# Patient Record
Sex: MALE | Race: WHITE | Employment: UNEMPLOYED | URBAN - METROPOLITAN AREA
[De-identification: names, ages, dates, MRNs, and addresses within clinical notes are randomized per-mention and may not be internally consistent; named-entity substitution may affect disease eponyms.]

---

## 2019-03-13 ENCOUNTER — OFFICE VISIT (OUTPATIENT)
Dept: ORTHOPEDIC SURGERY | Age: 74
End: 2019-03-13
Payer: COMMERCIAL

## 2019-03-13 VITALS
WEIGHT: 188 LBS | HEIGHT: 68 IN | DIASTOLIC BLOOD PRESSURE: 61 MMHG | SYSTOLIC BLOOD PRESSURE: 132 MMHG | HEART RATE: 66 BPM | RESPIRATION RATE: 16 BRPM | BODY MASS INDEX: 28.49 KG/M2

## 2019-03-13 DIAGNOSIS — M79.672 FOOT PAIN, LEFT: ICD-10-CM

## 2019-03-13 DIAGNOSIS — M20.22 HALLUX RIGIDUS OF LEFT FOOT: Primary | ICD-10-CM

## 2019-03-13 PROCEDURE — 99214 OFFICE O/P EST MOD 30 MIN: CPT | Performed by: ORTHOPAEDIC SURGERY

## 2019-05-06 ENCOUNTER — TELEPHONE (OUTPATIENT)
Dept: ORTHOPEDIC SURGERY | Age: 74
End: 2019-05-06

## 2019-05-10 ENCOUNTER — OFFICE VISIT (OUTPATIENT)
Dept: ORTHOPEDIC SURGERY | Age: 74
End: 2019-05-10
Payer: COMMERCIAL

## 2019-05-10 VITALS
DIASTOLIC BLOOD PRESSURE: 79 MMHG | SYSTOLIC BLOOD PRESSURE: 159 MMHG | WEIGHT: 194 LBS | HEART RATE: 89 BPM | RESPIRATION RATE: 16 BRPM | HEIGHT: 68 IN | BODY MASS INDEX: 29.4 KG/M2

## 2019-05-10 DIAGNOSIS — M48.062 SPINAL STENOSIS OF LUMBAR REGION WITH NEUROGENIC CLAUDICATION: Primary | ICD-10-CM

## 2019-05-10 PROCEDURE — 99213 OFFICE O/P EST LOW 20 MIN: CPT | Performed by: ORTHOPAEDIC SURGERY

## 2019-05-10 NOTE — PROGRESS NOTES
History of present illness:   Mr. Louie Jacob  is a pleasant 68 y.o. male is status post at least 6 decompressions and fusions from his cervical to lumbar spines and with a PMH of borderline diabetes, ulcerative colitis, arthritis of the spine here for consultation regarding increased low back pain after fall 6 days ago. His pain has improved substantially over the last 2 days. He reports generalized weakness of his bilateral leg and denies bowel or bladder dysfunction. He states he can sit for as long as he likes and stand for only a few minutes given his unsteadiness. He does report very frequent falls and uses a fracture to help ambulate at home. The pain does not disrupts his sleep. He takes methadone and Norco.  He has evaluated by neurology in the past 3 years. He is well-developed and well-nourished, is in no obvious pain and alert and oriented to person, place, and time. He demonstrates appropriate mood and affect. His skin is warm and dry. His gait is nonpainful, but he walks with quite a bit of instability. He stands with slight lumbar flexion. His lumbar flexion, extension and lateral bending are moderately reduced with pain. He has minimal tenderness over his lumbar spine and associated hardware without obvious muscle spasm. The skin over his lumbar spine shows a surgical scar. He has 4/5 motor strength of bilateral lower extremities. He has a negative straight leg raise, bilaterally. Normal deep tendon reflexes at knees. Sensation is intact to light touch L3 to S1 bilaterally. He has no clonus. Hip range of motion painless. I reviewed AP and lateral x-rays of his lumbar spine or obtain the office today. They show. They show severe multilevel degenerative disc disease, lumbar kyphosis and post-fusion changes. I do not see an obvious fracture.     MRIs of his cervical and thoracic spines from 3 years ago show degenerative changes, foraminal stenosis, postsurgical changes but no cord compression      Assessment:  Cervical stenosis  Thoracic stenosis  Lumbar stenosis    Plan:  I recommended he slowly increase his activities and return on an as-needed basis. I do not believe he is a candidate for spinal surgery at this time.

## 2020-09-01 ENCOUNTER — OFFICE VISIT (OUTPATIENT)
Dept: ORTHOPEDIC SURGERY | Age: 75
End: 2020-09-01
Payer: COMMERCIAL

## 2020-09-01 VITALS — BODY MASS INDEX: 29.4 KG/M2 | HEIGHT: 68 IN | WEIGHT: 194 LBS

## 2020-09-01 PROCEDURE — 99213 OFFICE O/P EST LOW 20 MIN: CPT | Performed by: PHYSICIAN ASSISTANT

## 2020-09-01 NOTE — PROGRESS NOTES
History of present illness:   Mr. Yaima Andres  is a pleasant 69 y.o. male is status post at least 6 decompressions and fusions from his cervical to lumbar spines and with a PMH of borderline diabetes, ulcerative colitis, arthritis of the spine here regarding his new low back pain after picking up a 30lb bag of dog food on 8/7/20. States his pain is in his left buttock to his medial knee. States his knee pain is greater than his back pain. States he was evaluated for his knee at [facility] and found no pathology. Pain is worse with standing and walking. Denies progressive weakness of his legs. Denies numbness and tingling. Ambulates with rolling walker. Denies bowel/bladder dysfunction. Has tried physical therapy and epidural injections in the past. He takes methadone and Norco.    He is well-developed and well-nourished, is in no obvious pain and alert and oriented to person, place, and time. He demonstrates appropriate mood and affect. His skin is warm and dry. His gait is nonpainful, but he walks with quite a bit of instability. He stands with slight lumbar flexion. His lumbar flexion, extension and lateral bending are moderately reduced with pain. He has minimal tenderness over his lumbar spine and associated hardware without obvious muscle spasm. The skin over his lumbar spine shows a well healed surgical scar. He has 4/5 motor strength of bilateral lower extremities. He has a negative straight leg raise, bilaterally. Normal deep tendon reflexes at knees. Sensation is intact to light touch L3 to S1 bilaterally. He has no clonus. Hip range of motion painless. I reviewed AP and lateral x-rays of his lumbar spine or obtain the office today. They show severe multilevel degenerative disc disease, lumbar kyphosis and post-fusion changes. I do not see an obvious fracture.     MRIs of his cervical and thoracic spines from 4 years ago show degenerative changes, foraminal stenosis, postsurgical changes but no cord compression    Assessment:  Lumbar stenosis  Lumbar radiculopathy    Plan:  Discussed treatment options including oral steroids, physical therapy, additional imaging, and epidural injections. Patient would like to proceed with lumbar MRI with and without contrast. States he will need conscious sedation to be completed. We will call him with results.      Loralyn Lennox PA-C

## 2020-09-03 ENCOUNTER — TELEPHONE (OUTPATIENT)
Dept: ORTHOPEDIC SURGERY | Age: 75
End: 2020-09-03

## 2020-09-03 NOTE — TELEPHONE ENCOUNTER
SCARLET for patient that MRI is approved. He will call [facility] to schedule.

## 2020-09-04 ENCOUNTER — TELEPHONE (OUTPATIENT)
Dept: ORTHOPEDIC SURGERY | Age: 75
End: 2020-09-04

## 2020-09-04 DIAGNOSIS — M48.062 SPINAL STENOSIS OF LUMBAR REGION WITH NEUROGENIC CLAUDICATION: Primary | ICD-10-CM

## 2020-09-14 ENCOUNTER — OFFICE VISIT (OUTPATIENT)
Dept: PRIMARY CARE CLINIC | Age: 75
End: 2020-09-14
Payer: COMMERCIAL

## 2020-09-14 PROCEDURE — 99211 OFF/OP EST MAY X REQ PHY/QHP: CPT | Performed by: NURSE PRACTITIONER

## 2020-09-14 NOTE — PATIENT INSTRUCTIONS

## 2020-09-14 NOTE — PROGRESS NOTES
Lisseth Naik received a viral test for COVID-19. They were educated on isolation and quarantine as appropriate. For any symptoms, they were directed to seek care from their PCP, given contact information to establish with a doctor, directed to an urgent care or the emergency room.

## 2020-09-16 LAB — SARS-COV-2, NAA: NOT DETECTED

## 2020-09-18 ENCOUNTER — ANESTHESIA (OUTPATIENT)
Dept: MRI IMAGING | Age: 75
End: 2020-09-18

## 2020-09-18 ENCOUNTER — ANESTHESIA EVENT (OUTPATIENT)
Dept: MRI IMAGING | Age: 75
End: 2020-09-18

## 2020-09-18 ENCOUNTER — HOSPITAL ENCOUNTER (OUTPATIENT)
Dept: MRI IMAGING | Age: 75
Discharge: HOME | End: 2020-09-18
Payer: COMMERCIAL

## 2020-09-18 VITALS
SYSTOLIC BLOOD PRESSURE: 111 MMHG | OXYGEN SATURATION: 97 % | DIASTOLIC BLOOD PRESSURE: 49 MMHG | RESPIRATION RATE: 14 BRPM

## 2020-09-18 VITALS
RESPIRATION RATE: 16 BRPM | WEIGHT: 207.78 LBS | DIASTOLIC BLOOD PRESSURE: 84 MMHG | SYSTOLIC BLOOD PRESSURE: 124 MMHG | BODY MASS INDEX: 31.59 KG/M2 | TEMPERATURE: 97 F | HEART RATE: 61 BPM | OXYGEN SATURATION: 96 %

## 2020-09-18 LAB
GLUCOSE BLD-MCNC: 113 MG/DL (ref 70–99)
PERFORMED ON: ABNORMAL

## 2020-09-18 PROCEDURE — 7100000000 HC PACU RECOVERY - FIRST 15 MIN

## 2020-09-18 PROCEDURE — 6360000002 HC RX W HCPCS: Performed by: NURSE ANESTHETIST, CERTIFIED REGISTERED

## 2020-09-18 PROCEDURE — A9577 INJ MULTIHANCE: HCPCS | Performed by: PHYSICIAN ASSISTANT

## 2020-09-18 PROCEDURE — 3700000001 HC ADD 15 MINUTES (ANESTHESIA)

## 2020-09-18 PROCEDURE — 2500000003 HC RX 250 WO HCPCS

## 2020-09-18 PROCEDURE — 3700000000 HC ANESTHESIA ATTENDED CARE

## 2020-09-18 PROCEDURE — 6360000002 HC RX W HCPCS

## 2020-09-18 PROCEDURE — 6360000004 HC RX CONTRAST MEDICATION: Performed by: PHYSICIAN ASSISTANT

## 2020-09-18 PROCEDURE — 7100000001 HC PACU RECOVERY - ADDTL 15 MIN

## 2020-09-18 PROCEDURE — 7100000011 HC PHASE II RECOVERY - ADDTL 15 MIN

## 2020-09-18 PROCEDURE — 72158 MRI LUMBAR SPINE W/O & W/DYE: CPT

## 2020-09-18 PROCEDURE — 2500000003 HC RX 250 WO HCPCS: Performed by: NURSE ANESTHETIST, CERTIFIED REGISTERED

## 2020-09-18 PROCEDURE — 2580000003 HC RX 258: Performed by: NURSE ANESTHETIST, CERTIFIED REGISTERED

## 2020-09-18 PROCEDURE — 7100000010 HC PHASE II RECOVERY - FIRST 15 MIN

## 2020-09-18 RX ORDER — GLYCOPYRROLATE 0.2 MG/ML
INJECTION INTRAMUSCULAR; INTRAVENOUS PRN
Status: DISCONTINUED | OUTPATIENT
Start: 2020-09-18 | End: 2020-09-18 | Stop reason: SDUPTHER

## 2020-09-18 RX ORDER — KETAMINE HCL IN NACL, ISO-OSM 100MG/10ML
SYRINGE (ML) INJECTION PRN
Status: DISCONTINUED | OUTPATIENT
Start: 2020-09-18 | End: 2020-09-18 | Stop reason: SDUPTHER

## 2020-09-18 RX ORDER — DEXAMETHASONE SODIUM PHOSPHATE 4 MG/ML
INJECTION, SOLUTION INTRA-ARTICULAR; INTRALESIONAL; INTRAMUSCULAR; INTRAVENOUS; SOFT TISSUE PRN
Status: DISCONTINUED | OUTPATIENT
Start: 2020-09-18 | End: 2020-09-18 | Stop reason: SDUPTHER

## 2020-09-18 RX ORDER — PROPOFOL 10 MG/ML
INJECTION, EMULSION INTRAVENOUS PRN
Status: DISCONTINUED | OUTPATIENT
Start: 2020-09-18 | End: 2020-09-18 | Stop reason: SDUPTHER

## 2020-09-18 RX ORDER — SODIUM CHLORIDE 9 MG/ML
INJECTION, SOLUTION INTRAVENOUS CONTINUOUS PRN
Status: DISCONTINUED | OUTPATIENT
Start: 2020-09-18 | End: 2020-09-18 | Stop reason: SDUPTHER

## 2020-09-18 RX ORDER — FENTANYL CITRATE 50 UG/ML
25 INJECTION, SOLUTION INTRAMUSCULAR; INTRAVENOUS EVERY 5 MIN PRN
Status: DISCONTINUED | OUTPATIENT
Start: 2020-09-18 | End: 2020-09-19 | Stop reason: HOSPADM

## 2020-09-18 RX ORDER — ONDANSETRON 2 MG/ML
INJECTION INTRAMUSCULAR; INTRAVENOUS PRN
Status: DISCONTINUED | OUTPATIENT
Start: 2020-09-18 | End: 2020-09-18 | Stop reason: SDUPTHER

## 2020-09-18 RX ORDER — MIDAZOLAM HYDROCHLORIDE 1 MG/ML
INJECTION INTRAMUSCULAR; INTRAVENOUS PRN
Status: DISCONTINUED | OUTPATIENT
Start: 2020-09-18 | End: 2020-09-18 | Stop reason: SDUPTHER

## 2020-09-18 RX ORDER — ONDANSETRON 2 MG/ML
4 INJECTION INTRAMUSCULAR; INTRAVENOUS
Status: ACTIVE | OUTPATIENT
Start: 2020-09-18 | End: 2020-09-18

## 2020-09-18 RX ORDER — OXYCODONE HYDROCHLORIDE AND ACETAMINOPHEN 5; 325 MG/1; MG/1
1 TABLET ORAL PRN
Status: ACTIVE | OUTPATIENT
Start: 2020-09-18 | End: 2020-09-18

## 2020-09-18 RX ORDER — FENTANYL CITRATE 50 UG/ML
INJECTION, SOLUTION INTRAMUSCULAR; INTRAVENOUS PRN
Status: DISCONTINUED | OUTPATIENT
Start: 2020-09-18 | End: 2020-09-18 | Stop reason: SDUPTHER

## 2020-09-18 RX ORDER — OXYCODONE HYDROCHLORIDE AND ACETAMINOPHEN 5; 325 MG/1; MG/1
2 TABLET ORAL PRN
Status: ACTIVE | OUTPATIENT
Start: 2020-09-18 | End: 2020-09-18

## 2020-09-18 RX ORDER — LIDOCAINE HYDROCHLORIDE 10 MG/ML
INJECTION, SOLUTION EPIDURAL; INFILTRATION; INTRACAUDAL; PERINEURAL PRN
Status: DISCONTINUED | OUTPATIENT
Start: 2020-09-18 | End: 2020-09-18 | Stop reason: SDUPTHER

## 2020-09-18 RX ADMIN — MIDAZOLAM 2 MG: 1 INJECTION INTRAMUSCULAR; INTRAVENOUS at 09:53

## 2020-09-18 RX ADMIN — DEXAMETHASONE SODIUM PHOSPHATE 8 MG: 4 INJECTION, SOLUTION INTRAMUSCULAR; INTRAVENOUS at 10:21

## 2020-09-18 RX ADMIN — LIDOCAINE HYDROCHLORIDE 50 MG: 10 INJECTION, SOLUTION EPIDURAL; INFILTRATION; INTRACAUDAL; PERINEURAL at 10:41

## 2020-09-18 RX ADMIN — MIDAZOLAM 2 MG: 1 INJECTION INTRAMUSCULAR; INTRAVENOUS at 09:54

## 2020-09-18 RX ADMIN — FENTANYL CITRATE 50 MCG: 50 INJECTION INTRAMUSCULAR; INTRAVENOUS at 09:54

## 2020-09-18 RX ADMIN — GLYCOPYRROLATE 0.2 MG: 0.2 INJECTION, SOLUTION INTRAMUSCULAR; INTRAVENOUS at 10:21

## 2020-09-18 RX ADMIN — Medication 30 MG: at 10:20

## 2020-09-18 RX ADMIN — ONDANSETRON 4 MG: 2 INJECTION INTRAMUSCULAR; INTRAVENOUS at 10:21

## 2020-09-18 RX ADMIN — PROPOFOL 200 MG: 10 INJECTION, EMULSION INTRAVENOUS at 10:41

## 2020-09-18 RX ADMIN — GADOBENATE DIMEGLUMINE 19 ML: 529 INJECTION, SOLUTION INTRAVENOUS at 10:57

## 2020-09-18 RX ADMIN — FENTANYL CITRATE 50 MCG: 50 INJECTION INTRAMUSCULAR; INTRAVENOUS at 10:20

## 2020-09-18 RX ADMIN — SODIUM CHLORIDE: 9 INJECTION, SOLUTION INTRAVENOUS at 09:52

## 2020-09-18 RX ADMIN — MIDAZOLAM 2 MG: 1 INJECTION INTRAMUSCULAR; INTRAVENOUS at 10:41

## 2020-09-18 RX ADMIN — Medication 30 MG: at 09:54

## 2020-09-18 ASSESSMENT — PAIN DESCRIPTION - PAIN TYPE: TYPE: CHRONIC PAIN

## 2020-09-18 ASSESSMENT — PAIN DESCRIPTION - ONSET: ONSET: ON-GOING

## 2020-09-18 ASSESSMENT — PAIN SCALES - GENERAL
PAINLEVEL_OUTOF10: 0
PAINLEVEL_OUTOF10: 2
PAINLEVEL_OUTOF10: 0
PAINLEVEL_OUTOF10: 0

## 2020-09-18 ASSESSMENT — PAIN DESCRIPTION - ORIENTATION: ORIENTATION: LOWER

## 2020-09-18 ASSESSMENT — PAIN DESCRIPTION - PROGRESSION: CLINICAL_PROGRESSION: NOT CHANGED

## 2020-09-18 ASSESSMENT — PAIN DESCRIPTION - LOCATION: LOCATION: BACK

## 2020-09-18 ASSESSMENT — PAIN DESCRIPTION - FREQUENCY: FREQUENCY: CONTINUOUS

## 2020-09-18 ASSESSMENT — PAIN DESCRIPTION - DESCRIPTORS: DESCRIPTORS: ACHING

## 2020-09-18 NOTE — ANESTHESIA POSTPROCEDURE EVALUATION
Department of Anesthesiology  Postprocedure Note    Patient: Merly Boilvar  MRN: [id number]  YOB: 1945  Date of evaluation: 9/18/2020  Time:  11:26 AM     Procedure Summary     Date:  09/18/20 Room / Location:  [facility]    Anesthesia Start:  3588 Anesthesia Stop:  1103    Procedure:  MRI LUMBAR SPINE W WO CONTRAST Diagnosis:       Spinal stenosis of lumbar region with neurogenic claudication      (Lumbar Stenosis)    Scheduled Providers:   Responsible Provider:  Vanessa Griffiths MD    Anesthesia Type:  MAC ASA Status:  3          Anesthesia Type: MAC    Milan Phase I: Milan Score: 8    Milan Phase II:      Last vitals: Reviewed and per EMR flowsheets.        Anesthesia Post Evaluation    Patient location during evaluation: bedside  Patient participation: complete - patient participated  Level of consciousness: awake  Pain score: 3  Airway patency: patent  Nausea & Vomiting: no nausea and no vomiting  Complications: no  Cardiovascular status: blood pressure returned to baseline  Respiratory status: acceptable  Hydration status: euvolemic

## 2020-09-18 NOTE — ANESTHESIA PRE PROCEDURE
[facility] Department of Anesthesiology  Pre-Anesthesia Evaluation/Consultation       Name:  Jennyfer Connor  : 1945  Age:  69 y.o.                                            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 ([facility] [facility]    Maintenance antineoplastic immunotherapy    Foreign body of leg    Dupuytren contracture    Trigger ring finger of left hand    Hallux rigidus of left foot     Past Medical History:   Diagnosis Date    Decreased testosterone level     History of    Degenerative arthritis of lumbar spine     8 back surgeries    Diabetes ([facility]     Borderline    History of suicidal ideation     Radiculopathy     Of bilateral upper extremities and right lower extremity due to 2 previous back and neck surgery    Spondyloarthropathy     Secondary to ulcerative colitis    Ulcerative colitis ([facility] [facility]     history of      Past Surgical History:   Procedure Laterality Date    BACK SURGERY      10 surgeries total    COSMETIC SURGERY      facelift    KNEE SURGERY      Left    MRI BRAIN WO CONTRAST      NECK SURGERY      fusion    [place] DECOMPRESS SPINAL CORD,1 SEG       Social History     Tobacco Use    Smoking status: Never Smoker    Smokeless tobacco: Never Used   Substance Use Topics    Alcohol use: No    Drug use: No     Medications  Current Outpatient Medications on File Prior to Encounter   Medication Sig Dispense Refill    L-methylfolate-B6-B12 (METANX) 9-05.152-7-86 MG CAPS capsule TAKE ONE CAPSULE BY MOUTH TWO TIMES DAILY 180 capsule 3    BP Readings from Last 3 Encounters:   05/10/19 (!) 159/79   03/13/19 132/61   12/22/16 138/68     Vital Signs Statistics (for past 48 hrs)     No data recorded  BP Readings from Last 3 Encounters:   05/10/19 (!) 159/79   03/13/19 132/61   12/22/16 138/68       BMI  Body mass index is 31.59 kg/m². Estimated body mass index is 31.59 kg/m² as calculated from the following:    Height as of 9/1/20: 5' 8\" (1.727 m). Weight as of this encounter: 207 lb 12.5 oz (94.3 kg). CBC   Lab Results   Component Value Date    WBC 6.3 05/24/2016    RBC 4.77 05/24/2016    HGB 14.7 05/24/2016    HCT 45.1 05/24/2016    MCV 94.6 05/24/2016    RDW 14.6 05/24/2016     05/24/2016     CMP    Lab Results   Component Value Date     05/24/2016    K 4.1 05/24/2016     05/24/2016    CO2 28 05/24/2016    BUN 17 06/23/2016    CREATININE 1.0 06/23/2016    GFRAA >60 06/23/2016    GFRAA 95 03/13/2012    AGRATIO 1.9 05/24/2016    LABGLOM >60 06/23/2016    GLUCOSE 82 05/24/2016    PROT 6.9 05/24/2016    PROT 5.8 05/18/2011    CALCIUM 9.1 05/24/2016    BILITOT 0.5 05/24/2016    ALKPHOS 71 05/24/2016    AST 28 05/24/2016    ALT 28 05/24/2016     BMP    Lab Results   Component Value Date     05/24/2016    K 4.1 05/24/2016     05/24/2016    CO2 28 05/24/2016    BUN 17 06/23/2016    CREATININE 1.0 06/23/2016    CALCIUM 9.1 05/24/2016    GFRAA >60 06/23/2016    GFRAA 95 03/13/2012    LABGLOM >60 06/23/2016    GLUCOSE 82 05/24/2016     POCGlucose  No results for input(s): GLUCOSE in the last 72 hours.    Coags    Lab Results   Component Value Date    PROTIME 12.5 03/13/2012    INR 0.9 03/13/2012    APTT 30.4 86/90/6316     HCG (If Applicable) No results found for: PREGTESTUR, PREGSERUM, HCG, HCGQUANT   ABGs No results found for: PHART, PO2ART, FSB1APD, PFZ7AFP, BEART, T9KEWUQR   Type & Screen (If Applicable)  Lab Results   Component Value Date    LABABO O 03/21/2012    [facility] DOTTIE Positive 03/21/2012

## 2020-09-18 NOTE — PROGRESS NOTES
Alert and oriented. No c/o. Vss. Tolerated sitting up well. Reusing po at this time. Waiting for wife to return to hospital for discharge.
From PACU. Alert and oriented. No c/o. Vss.  Refusing anything to eat or drink. vss.
Pt arrived to PACU from MRI. Pt sleeping on 3l/nc.  VSS
Pt awake and alert. VSS. Ok to transfer to phase 2 care.
Wife has been called multiple times to return to the hospital for the patient's discharge. She has finally answered the phone ans said she would return for .
Wife has returned to the hospital. Verbalized understanding of discharge instructions. patient discharged.
5

## 2020-09-23 ENCOUNTER — TELEPHONE (OUTPATIENT)
Dept: ORTHOPEDIC SURGERY | Age: 75
End: 2020-09-23

## 2020-09-25 NOTE — TELEPHONE ENCOUNTER
Spoke to patient going over his Mri results. Patient would  Like to proceed with coming to the office to discuss options  With Dr. Nataliia Potts. I made an appt for 10.13.2020 @ 9:40 on behalf of the patient.

## 2020-09-25 NOTE — TELEPHONE ENCOUNTER
ANTONIO Christian    Caller: Unspecified (2 days ago, 12:54 PM)               Patient's lumbar MRI shows subacute S1 compression fracture along with synovial cyst and severe bilateral foraminal narrowing at L5-S1. He is welcome to try additional epidural injection for his pain or return to the office to discuss options with Dr. Kanu Washburn.

## 2020-09-25 NOTE — TELEPHONE ENCOUNTER
Attempted to reach patient again. No answer so I had to leave a VM. Asking him to return my call to go over his MRI results. When patient does please IM me to see if I am able to speak with him.  If I am not then please place a message here and inform patient I will call him back.   -Thanks

## 2020-10-13 ENCOUNTER — OFFICE VISIT (OUTPATIENT)
Dept: ORTHOPEDIC SURGERY | Age: 75
End: 2020-10-13
Payer: COMMERCIAL

## 2020-10-13 VITALS — BODY MASS INDEX: 31.37 KG/M2 | WEIGHT: 207 LBS | HEIGHT: 68 IN

## 2020-10-13 PROCEDURE — 99213 OFFICE O/P EST LOW 20 MIN: CPT | Performed by: ORTHOPAEDIC SURGERY

## 2020-10-13 NOTE — PROGRESS NOTES
History of present illness:   Mr. Karma Sy  is a pleasant 69 y.o. male is status post at least 6 decompressions and fusions from his cervical to lumbar spines and with a PMH of borderline diabetes, ulcerative colitis, arthritis of the spine here regarding his new low back pain after picking up a 30lb bag of dog food on 8/7/20. Dr. Justice Galicia at [facility] for left medial knee pain. Dr. Lacy Avelar recommended physical therapy . He denies progressive weakness of his legs. Denies numbness and tingling. Ambulates with rolling walker. Denies bowel/bladder dysfunction. Has tried physical therapy and epidural injections in the past. He takes methadone and Norco.    He is well-developed and well-nourished, is in no obvious pain and alert and oriented to person, place, and time. He demonstrates appropriate mood and affect. His skin is warm and dry. His gait is nonpainful, but he walks with quite a bit of instability. He stands with slight lumbar flexion. His lumbar flexion, extension and lateral bending are moderately reduced with pain. He has minimal tenderness over his lumbar spine and associated hardware without obvious muscle spasm. The skin over his lumbar spine shows a well healed surgical scar. He has 4/5 motor strength of bilateral lower extremities. He has a negative straight leg raise, bilaterally. Normal deep tendon reflexes at knees. Sensation is intact to light touch L3 to S1 bilaterally. He has no clonus. Hip range of motion painless. I reviewed AP and lateral x-rays of his lumbar spine from a previous office visit. They show severe multilevel degenerative disc disease, lumbar kyphosis and post-fusion changes. I do not see an obvious fracture. I reviewed MRI images of his lumbar spine from September 18, 2020.   Those show postsurgical changes and a small left-sided synovial cyst L5-S1    Assessment  Synovial cyst left L5-S1    Plan:  Discussed treatment options including observation and physical therapy.   I recommended he proceed with the physical therapy recommended by Dr. Charlie Sam

## 2020-10-20 ENCOUNTER — OFFICE VISIT (OUTPATIENT)
Dept: ENT CLINIC | Age: 75
End: 2020-10-20
Payer: COMMERCIAL

## 2020-10-20 VITALS
SYSTOLIC BLOOD PRESSURE: 158 MMHG | HEART RATE: 87 BPM | WEIGHT: 200 LBS | DIASTOLIC BLOOD PRESSURE: 80 MMHG | BODY MASS INDEX: 30.31 KG/M2 | HEIGHT: 68 IN | TEMPERATURE: 97.8 F

## 2020-10-20 PROCEDURE — 99203 OFFICE O/P NEW LOW 30 MIN: CPT | Performed by: OTOLARYNGOLOGY

## 2020-10-20 RX ORDER — MONTELUKAST SODIUM 10 MG/1
10 TABLET ORAL NIGHTLY
Qty: 30 TABLET | Refills: 1 | Status: SHIPPED | OUTPATIENT
Start: 2020-10-20

## 2020-10-20 RX ORDER — OMEPRAZOLE 40 MG/1
40 CAPSULE, DELAYED RELEASE ORAL DAILY
Qty: 30 CAPSULE | Refills: 1 | Status: SHIPPED | OUTPATIENT
Start: 2020-10-20 | End: 2020-11-19

## 2020-10-20 ASSESSMENT — ENCOUNTER SYMPTOMS
SINUS PAIN: 0
EYES NEGATIVE: 1
ALLERGIC/IMMUNOLOGIC NEGATIVE: 1
SHORTNESS OF BREATH: 0
TROUBLE SWALLOWING: 0
RHINORRHEA: 1
VOICE CHANGE: 0
SINUS PRESSURE: 0
SORE THROAT: 0
FACIAL SWELLING: 0
CHEST TIGHTNESS: 0
COUGH: 1

## 2020-10-20 NOTE — PROGRESS NOTES
SUBJECTIVE:    Chief Complaint   Patient presents with    New Patient     Sinus problems, nasal drainage, mucus in throat. Karma Sy is a 69 y.o. male    Patient has been having  accumulation of mucus producing a nonproductive cough without shortness of breath this past summer. He took some erythromycin that he had leftover previously and it did produce some slight improvement. However, he continues to notice mucus in the cough without actual difficulty in swallowing and without the actual hoarseness. He has had no fever. He does not smoke. He denies actual GERD symptoms. Past Medical History:   Diagnosis Date    Decreased testosterone level     History of    Degenerative arthritis of lumbar spine     8 back surgeries    Diabetes ([address]     Borderline    History of suicidal ideation     Radiculopathy     Of bilateral upper extremities and right lower extremity due to 2 previous back and neck surgery    Spondyloarthropathy     Secondary to ulcerative colitis    Ulcerative colitis ([address]     history of       Past Surgical History:   Procedure Laterality Date    BACK SURGERY      10 surgeries total    COSMETIC SURGERY      facelift    KNEE SURGERY      Left    MRI BRAIN WO CONTRAST      NECK SURGERY      fusion    [place] DECOMPRESS SPINAL CORD,1 SEG        Family History   Problem Relation Age of Onset    Cancer Mother     Heart Disease Father     Diabetes Father     Depression Other     Anxiety Disorder Other     Cancer Other       Social History     Tobacco Use    Smoking status: Never Smoker    Smokeless tobacco: Never Used   Substance Use Topics    Alcohol use: No        Review of Systems:  Review of Systems   Constitutional: Negative. Negative for fever and unexpected weight change. HENT: Positive for postnasal drip and rhinorrhea.  Negative for congestion, dental problem, drooling, ear discharge, ear pain, facial swelling, hearing loss, mouth sores, nosebleeds, sinus pressure, sinus pain, sneezing, sore throat, tinnitus, trouble swallowing and voice change. Eyes: Negative. Respiratory: Positive for cough. Negative for chest tightness and shortness of breath. Cardiovascular: Negative. Endocrine: Negative. Skin: Negative. Allergic/Immunologic: Negative. Negative for environmental allergies and immunocompromised state. Neurological: Negative. Hematological: Negative. Psychiatric/Behavioral: Negative. OBJECTIVE:  BP (!) 158/80 (Site: Right Upper Arm)   Pulse 87   Temp 97.8 °F (36.6 °C) (Temporal)   Ht 5' 8\" (1.727 m)   Wt 200 lb (90.7 kg)   BMI 30.41 kg/m²   Physical Exam  Vitals signs and nursing note reviewed. Constitutional:       General: He is not in acute distress. Appearance: Normal appearance. He is normal weight. He is not ill-appearing, toxic-appearing or diaphoretic. HENT:      Head: Normocephalic and atraumatic. Right Ear: Tympanic membrane, ear canal and external ear normal. There is no impacted cerumen. Left Ear: Tympanic membrane, ear canal and external ear normal. There is no impacted cerumen. Nose: Congestion present. Mouth/Throat:      Mouth: Mucous membranes are dry. Pharynx: Oropharynx is clear. No oropharyngeal exudate or posterior oropharyngeal erythema. Comments: Indirect laryngoscopy reveals normal appearance to the vocal cords with no lesions noted. There is some erythema present in the posterior arytenoid area consistent with reflux but otherwise examination is unremarkable. Eyes:      Extraocular Movements: Extraocular movements intact. Conjunctiva/sclera: Conjunctivae normal.      Pupils: Pupils are equal, round, and reactive to light. Neck:      Musculoskeletal: Normal range of motion. No neck rigidity or muscular tenderness. Cardiovascular:      Rate and Rhythm: Normal rate and regular rhythm.    Pulmonary:      Effort: Pulmonary effort is normal.   Lymphadenopathy: Cervical: No cervical adenopathy. Skin:     General: Skin is warm and dry. Neurological:      General: No focal deficit present. Mental Status: He is alert. Mental status is at baseline. ASSESSMENT:    Findings seem to be those associated with allergic sinusitis and postnasal drainage along with laryngeal reflux. PLAN:     I have suggested a trial of omeprazole along with Singulair. He will contact the office in 2 weeks to determine his response.     Yusuf Ortega MD

## 2020-11-23 ENCOUNTER — TELEPHONE (OUTPATIENT)
Dept: ENT CLINIC | Age: 75
End: 2020-11-23

## 2021-02-02 ENCOUNTER — TELEPHONE (OUTPATIENT)
Dept: ORTHOPEDIC SURGERY | Age: 76
End: 2021-02-02